# Patient Record
Sex: FEMALE | Race: WHITE | NOT HISPANIC OR LATINO
[De-identification: names, ages, dates, MRNs, and addresses within clinical notes are randomized per-mention and may not be internally consistent; named-entity substitution may affect disease eponyms.]

---

## 2018-12-24 PROBLEM — Z00.00 ENCOUNTER FOR PREVENTIVE HEALTH EXAMINATION: Status: ACTIVE | Noted: 2018-12-24

## 2020-01-28 ENCOUNTER — APPOINTMENT (OUTPATIENT)
Dept: MAMMOGRAPHY | Facility: CLINIC | Age: 75
End: 2020-01-28
Payer: MEDICARE

## 2020-01-28 ENCOUNTER — APPOINTMENT (OUTPATIENT)
Dept: OBGYN | Facility: CLINIC | Age: 75
End: 2020-01-28
Payer: MEDICARE

## 2020-01-28 VITALS
WEIGHT: 175 LBS | HEIGHT: 64 IN | SYSTOLIC BLOOD PRESSURE: 150 MMHG | DIASTOLIC BLOOD PRESSURE: 88 MMHG | BODY MASS INDEX: 29.88 KG/M2

## 2020-01-28 DIAGNOSIS — Z01.411 ENCOUNTER FOR GYNECOLOGICAL EXAMINATION (GENERAL) (ROUTINE) WITH ABNORMAL FINDINGS: ICD-10-CM

## 2020-01-28 DIAGNOSIS — L08.9 LOCAL INFECTION OF THE SKIN AND SUBCUTANEOUS TISSUE, UNSPECIFIED: ICD-10-CM

## 2020-01-28 DIAGNOSIS — Z86.39 PERSONAL HISTORY OF OTHER ENDOCRINE, NUTRITIONAL AND METABOLIC DISEASE: ICD-10-CM

## 2020-01-28 PROCEDURE — 77063 BREAST TOMOSYNTHESIS BI: CPT

## 2020-01-28 PROCEDURE — 77067 SCR MAMMO BI INCL CAD: CPT

## 2020-01-28 PROCEDURE — 99397 PER PM REEVAL EST PAT 65+ YR: CPT

## 2020-01-28 PROCEDURE — 99212 OFFICE O/P EST SF 10 MIN: CPT | Mod: 25

## 2020-01-28 RX ORDER — OMEPRAZOLE 20 MG/1
20 CAPSULE, DELAYED RELEASE ORAL
Refills: 0 | Status: ACTIVE | COMMUNITY

## 2020-01-28 NOTE — PHYSICAL EXAM
[Awake] : awake [Alert] : alert [LAD] : no lymphadenopathy [Acute Distress] : no acute distress [Thyroid Nodule] : no thyroid nodule [Goiter] : no goiter [Mass] : no breast mass [Nipple Discharge] : no nipple discharge [Axillary LAD] : no axillary lymphadenopathy [Soft] : soft [Tender] : non tender [Oriented x3] : oriented to person, place, and time [Normal] : cervix [No Bleeding] : there was no active vaginal bleeding [Uterine Adnexae] : were not tender and not enlarged

## 2020-01-28 NOTE — CHIEF COMPLAINT
[Annual Visit] : annual visit [FreeTextEntry1] : 75 y/o P3 presents for annual exam. After exam patient requests evaluation of sore on her hand as well.\par Pt last pap was >2 years ago\par Last mammo was also >2 years ago\par Last colonoscopy was last month\par BOne density was not quite 2 years ago\par b/p today was elevated today-pt reports she has never had an elevated b/p before\par \par Sore on hand was 2/2 to running her hand along a railing and getting a splinter which she removed. She has since popped it with a sterilized pin a couple of times to relieve the pressure and treated it with OTC bacitracin which has not helped

## 2020-01-29 LAB — HPV HIGH+LOW RISK DNA PNL CVX: NOT DETECTED

## 2020-02-03 LAB — CYTOLOGY CVX/VAG DOC THIN PREP: ABNORMAL

## 2021-02-24 ENCOUNTER — APPOINTMENT (OUTPATIENT)
Dept: OBGYN | Facility: CLINIC | Age: 76
End: 2021-02-24
Payer: COMMERCIAL

## 2021-02-24 VITALS
DIASTOLIC BLOOD PRESSURE: 72 MMHG | HEIGHT: 64 IN | WEIGHT: 166 LBS | SYSTOLIC BLOOD PRESSURE: 120 MMHG | BODY MASS INDEX: 28.34 KG/M2

## 2021-02-24 PROCEDURE — 99072 ADDL SUPL MATRL&STAF TM PHE: CPT

## 2021-02-24 PROCEDURE — 99397 PER PM REEVAL EST PAT 65+ YR: CPT

## 2021-02-26 NOTE — REASON FOR VISIT
[Annual] : an annual visit. [FreeTextEntry2] : Recently had COVID over 4 weeks ago, not hospitalized, feeling well today.

## 2021-02-26 NOTE — PLAN
Presents to ED after being found down in a local store. CPR down on scene by bystanders. EMS reports palpable pulse upon arrival on scene. Pt awake and alert at this time. 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 Respirations even and unlabored. Skin warm and dry to touch. No distress noted. Will continue to monitor.  
[FreeTextEntry1] : Pap, HPV today\par Mammo rx given\par Pt to f/u with PCP regarding colon cancer screening\par D/w pt cervical cancer screening per [facility], pt desires to continue yearly pap smears.\par RTO in 1 year or sooner prn

## 2021-02-26 NOTE — PHYSICAL EXAM
[Appropriately responsive] : appropriately responsive [Alert] : alert [No Acute Distress] : no acute distress [No Lymphadenopathy] : no lymphadenopathy [Regular Rate Rhythm] : regular rate rhythm [No Murmurs] : no murmurs [Clear to Auscultation B/L] : clear to auscultation bilaterally [Soft] : soft [Non-tender] : non-tender [Non-distended] : non-distended [No HSM] : No HSM [Oriented x3] : oriented x3 [Examination Of The Breasts] : a normal appearance [No Masses] : no breast masses were palpable [Labia Majora] : normal [Labia Minora] : normal [No Bleeding] : There was no active vaginal bleeding [Normal] : normal [Uterine Adnexae] : normal [Declined] : Patient declined rectal exam

## 2021-03-01 LAB
CYTOLOGY CVX/VAG DOC THIN PREP: ABNORMAL
HPV HIGH+LOW RISK DNA PNL CVX: NOT DETECTED

## 2021-05-11 ENCOUNTER — RESULT REVIEW (OUTPATIENT)
Age: 76
End: 2021-05-11

## 2021-05-11 ENCOUNTER — APPOINTMENT (OUTPATIENT)
Dept: MAMMOGRAPHY | Facility: CLINIC | Age: 76
End: 2021-05-11
Payer: MEDICARE

## 2021-05-11 PROCEDURE — 77063 BREAST TOMOSYNTHESIS BI: CPT

## 2021-05-11 PROCEDURE — 77067 SCR MAMMO BI INCL CAD: CPT

## 2022-04-18 ENCOUNTER — APPOINTMENT (OUTPATIENT)
Dept: OBGYN | Facility: CLINIC | Age: 77
End: 2022-04-18
Payer: MEDICARE

## 2022-04-18 VITALS
DIASTOLIC BLOOD PRESSURE: 78 MMHG | WEIGHT: 175 LBS | SYSTOLIC BLOOD PRESSURE: 118 MMHG | BODY MASS INDEX: 29.88 KG/M2 | HEIGHT: 64 IN

## 2022-04-18 PROCEDURE — G0101: CPT

## 2022-04-18 RX ORDER — CEPHALEXIN 250 MG/1
250 CAPSULE ORAL 4 TIMES DAILY
Qty: 40 | Refills: 0 | Status: DISCONTINUED | COMMUNITY
Start: 2020-01-28 | End: 2022-04-18

## 2022-04-18 NOTE — PHYSICAL EXAM
[Appropriately responsive] : appropriately responsive [Alert] : alert [No Acute Distress] : no acute distress [No Lymphadenopathy] : no lymphadenopathy [Regular Rate Rhythm] : regular rate rhythm [No Murmurs] : no murmurs [Clear to Auscultation B/L] : clear to auscultation bilaterally [Soft] : soft [Non-tender] : non-tender [Non-distended] : non-distended [No Lesions] : no lesions [No HSM] : No HSM [No Mass] : no mass [Oriented x3] : oriented x3 [Examination Of The Breasts] : a normal appearance [No Masses] : no breast masses were palpable [Normal] : normal [Uterine Adnexae] : normal [Declined] : Patient declined rectal exam [FreeTextEntry1] : lichen sclerosus [FreeTextEntry2] : lichen sclerosus

## 2022-04-18 NOTE — DISCUSSION/SUMMARY
[FreeTextEntry1] : unremarkable CBE  exams\par SBE taught and encouraged monthly\par Mammo ordered\par Pap not collected after discussion of ACOG recommendations\par bilateral figure 8 lichen sclerosus, very pale, patient reports occasional itching only.  \par We reviewed etiology and sequelae of lichen sclerosus and Rx sent for clobetasol to use 3 x weekly to entire affected area.  RTO x 2 months to review and consider biopsy\par \par  I reviewed measures for maintaining optimal bone density including dietary intake of 1200 mg calcium and 800 units  of vitamin D daily and 30 minutes of weight bearing exercise for a minimum of 3 x weekly.  Patient given a list of dietary sources of calcium and vitamin D.  Patient verbalizes understanding of these recommendations\par

## 2022-04-18 NOTE — HISTORY OF PRESENT ILLNESS
[Patient reported mammogram was normal] : Patient reported mammogram was normal [Patient reported PAP Smear was normal] : Patient reported PAP Smear was normal [Patient reported colonoscopy was normal] : Patient reported colonoscopy was normal [HIV test declined] : HIV test declined [Syphilis test declined] : Syphilis test declined [Gonorrhea test declined] : Gonorrhea test declined [Chlamydia test declined] : Chlamydia test declined [Trichomonas test declined] : Trichomonas test declined [postmenopausal] : postmenopausal [N] : Patient is not sexually active [Y] : Positive pregnancy history [TextBox_4] : 76 yoP3 for annual exam today.  Not certain that she needs further Paps.  No hx abnormal Paps, not sexually active for many years, never a smoker.  Hx hyperlipedmia and GERD.  Last Dexa 2 years ago was improved from former finding of mild osteopenia.   [Mammogramdate] : 2021 [BoneDensityDate] : 2020 [PapSmeardate] : 2021 [TextBox_37] : mild osteopenia [ColonoscopyDate] : 2019 [PGHxTotal] : 3 [[facility]

## 2022-05-02 RX ORDER — CLOBETASOL PROPIONATE 0.5 MG/G
0.05 CREAM TOPICAL
Qty: 45 | Refills: 2 | Status: ACTIVE | COMMUNITY
Start: 2022-04-18

## 2022-05-10 RX ORDER — CLOBETASOL PROPIONATE 0.5 MG/G
0.05 CREAM TOPICAL 3 TIMES DAILY
Qty: 45 | Refills: 2 | Status: ACTIVE | COMMUNITY
Start: 2022-05-10 | End: 1900-01-01

## 2022-05-13 ENCOUNTER — NON-APPOINTMENT (OUTPATIENT)
Age: 77
End: 2022-05-13

## 2022-06-13 ENCOUNTER — RESULT REVIEW (OUTPATIENT)
Age: 77
End: 2022-06-13

## 2022-06-13 ENCOUNTER — APPOINTMENT (OUTPATIENT)
Dept: MAMMOGRAPHY | Facility: CLINIC | Age: 77
End: 2022-06-13
Payer: MEDICARE

## 2022-06-13 PROCEDURE — 77067 SCR MAMMO BI INCL CAD: CPT

## 2022-06-13 PROCEDURE — 77063 BREAST TOMOSYNTHESIS BI: CPT

## 2022-06-27 ENCOUNTER — APPOINTMENT (OUTPATIENT)
Dept: OBGYN | Facility: CLINIC | Age: 77
End: 2022-06-27
Payer: MEDICARE

## 2022-06-27 VITALS
WEIGHT: 177 LBS | SYSTOLIC BLOOD PRESSURE: 126 MMHG | DIASTOLIC BLOOD PRESSURE: 80 MMHG | BODY MASS INDEX: 30.22 KG/M2 | HEIGHT: 64 IN

## 2022-06-27 PROCEDURE — 99213 OFFICE O/P EST LOW 20 MIN: CPT

## 2022-06-27 NOTE — PLAN
[FreeTextEntry1] : Rx Halobetaol to use nightly for 12 weeks\par RTO to evaluate progress and may taper to 2 x weekly at that point\par No lesions noted\par  Patient verbalizes understanding of and agreement with this plan.  All questions answered to patient's satisfaction.\par

## 2022-06-27 NOTE — PHYSICAL EXAM
[Appropriately responsive] : appropriately responsive [Alert] : alert [No Acute Distress] : no acute distress [Oriented x3] : oriented x3 [FreeTextEntry1] : lichen sclerosus [FreeTextEntry2] : lichen sclerosus

## 2022-08-03 RX ORDER — BETAMETHASONE DIPROPIONATE 0.5 MG/G
0.05 OINTMENT TOPICAL TWICE DAILY
Qty: 1 | Refills: 2 | Status: ACTIVE | COMMUNITY
Start: 2022-08-03

## 2022-09-26 ENCOUNTER — APPOINTMENT (OUTPATIENT)
Dept: OBGYN | Facility: CLINIC | Age: 77
End: 2022-09-26

## 2022-09-26 VITALS — WEIGHT: 178 LBS | BODY MASS INDEX: 30.39 KG/M2 | HEIGHT: 64 IN

## 2022-09-26 PROCEDURE — 99213 OFFICE O/P EST LOW 20 MIN: CPT

## 2022-09-26 NOTE — HISTORY OF PRESENT ILLNESS
[FreeTextEntry1] : 76 y.o. P3 with dx of lichen sclerosus.  Had inadequate treatment and was restarted on Clobetasol 6/27/22.  Here for re-evaluation of efficacy. Feels well, no further itching.

## 2022-09-26 NOTE — PHYSICAL EXAM
[Appropriately responsive] : appropriately responsive [Alert] : alert [Oriented x3] : oriented x3 [Labia Majora] : normal [Labia Minora] : normal [Normal] : normal [FreeTextEntry1] : Lichen sclerosus resolved [FreeTextEntry2] : Lichen sclerosus resolved

## 2022-09-26 NOTE — PLAN
[FreeTextEntry1] : Lichen sclerosus appears resolved and patient denies any further vulvar itching\par We discussed option of 2x weekly Halbetosol maintenance therapy vs. stopping Halbetasol and  RTO x 3 months to re-evaluate and patient opts for latter option.\par RTO x 3 months

## 2023-01-04 ENCOUNTER — APPOINTMENT (OUTPATIENT)
Dept: OBGYN | Facility: CLINIC | Age: 78
End: 2023-01-04
Payer: MEDICARE

## 2023-01-04 VITALS
SYSTOLIC BLOOD PRESSURE: 136 MMHG | DIASTOLIC BLOOD PRESSURE: 82 MMHG | HEIGHT: 61 IN | BODY MASS INDEX: 33.99 KG/M2 | WEIGHT: 180 LBS

## 2023-01-04 DIAGNOSIS — N90.4 LEUKOPLAKIA OF VULVA: ICD-10-CM

## 2023-01-04 PROCEDURE — 99213 OFFICE O/P EST LOW 20 MIN: CPT

## 2023-01-04 NOTE — HISTORY OF PRESENT ILLNESS
[FreeTextEntry1] : 77 y.o. P3 with dx of lichen sclerosus. Had inadequate treatment and was restarted on Clobetasol 6/27/22. Improvement noted at last visit  9/26/22 and it was decided to change patient to twice weekly Clobetasol which she has been doing.  Feels well, no further itching.

## 2023-01-04 NOTE — PLAN
[FreeTextEntry1] : Lichen sclerosus not currently present and itching has stopped per patient.  I have advised to maintain twice weekly regimen and will review again at annual exam 4/2023

## 2023-01-04 NOTE — PHYSICAL EXAM
[Appropriately responsive] : appropriately responsive [Alert] : alert [No Acute Distress] : no acute distress [Oriented x3] : oriented x3 [Labia Majora] : normal [Labia Minora] : normal [Normal] : normal [FreeTextEntry1] : mild atrophy

## 2023-02-13 ENCOUNTER — OFFICE (OUTPATIENT)
Dept: URBAN - METROPOLITAN AREA CLINIC 9 | Facility: CLINIC | Age: 78
Setting detail: OPHTHALMOLOGY
End: 2023-02-13
Payer: MEDICARE

## 2023-02-13 DIAGNOSIS — H43.813: ICD-10-CM

## 2023-02-13 DIAGNOSIS — Z96.1: ICD-10-CM

## 2023-02-13 DIAGNOSIS — H26.491: ICD-10-CM

## 2023-02-13 DIAGNOSIS — H43.22: ICD-10-CM

## 2023-02-13 DIAGNOSIS — H52.4: ICD-10-CM

## 2023-02-13 DIAGNOSIS — H16.223: ICD-10-CM

## 2023-02-13 PROCEDURE — 92015 DETERMINE REFRACTIVE STATE: CPT | Performed by: OPHTHALMOLOGY

## 2023-02-13 PROCEDURE — 92014 COMPRE OPH EXAM EST PT 1/>: CPT | Performed by: OPHTHALMOLOGY

## 2023-02-13 ASSESSMENT — KERATOMETRY
OD_K2POWER_DIOPTERS: 46.50
METHOD_AUTO_MANUAL: AUTO
OS_AXISANGLE_DEGREES: 090
OS_K1POWER_DIOPTERS: 46.25
OD_K1POWER_DIOPTERS: 46.25
OD_AXISANGLE_DEGREES: 135
OS_K2POWER_DIOPTERS: 46.25

## 2023-02-13 ASSESSMENT — REFRACTION_MANIFEST
OD_ADD: +2.75
OD_VA2: 20/20(J1+)
OS_SPHERE: -0.25
OS_VA1: 20/20
OD_VA2: 20/20(J1+)
OS_ADD: +2.75
OS_AXIS: 175
OD_SPHERE: PLANO
OU_VA: 20/20
OS_VA2: 20/20(J1+)
OS_ADD: +2.75
OS_CYLINDER: +0.75
OD_CYLINDER: +0.50
OS_SPHERE: PLANO
OD_VA1: 20/20
OD_CYLINDER: SPHERE
OS_CYLINDER: +0.75
OD_AXIS: 005
OS_VA2: 20/20(J1+)
OS_AXIS: 180
OD_ADD: +2.75
OS_VA1: 20/20
OD_VA1: 20/20
OD_SPHERE: PLANO
OU_VA: 20/20

## 2023-02-13 ASSESSMENT — REFRACTION_AUTOREFRACTION
OD_CYLINDER: +0.50
OS_AXIS: 176
OS_SPHERE: -0.25
OD_SPHERE: -0.25
OS_CYLINDER: +1.00
OD_AXIS: 004

## 2023-02-13 ASSESSMENT — REFRACTION_CURRENTRX
OD_AXIS: 013
OS_CYLINDER: +0.50
OD_VPRISM_DIRECTION: PROGS
OS_VPRISM_DIRECTION: PROGS
OS_VPRISM_DIRECTION: PROGS
OD_CYLINDER: +0.75
OD_AXIS: 003
OS_SPHERE: PLANO
OD_SPHERE: PLANO
OS_SPHERE: -0.25
OS_ADD: +2.50
OD_SPHERE: -0.25
OD_OVR_VA: 20/
OS_OVR_VA: 20/
OS_AXIS: 166
OD_VPRISM_DIRECTION: PROGS
OS_CYLINDER: +0.50
OS_AXIS: 156
OS_OVR_VA: 20/
OS_ADD: +2.50
OD_ADD: +2.50
OD_ADD: +2.75
OD_CYLINDER: +0.75
OD_OVR_VA: 20/

## 2023-02-13 ASSESSMENT — AXIALLENGTH_DERIVED
OS_AL: 22.5341
OD_AL: 22.5811
OS_AL: 22.5786

## 2023-02-13 ASSESSMENT — VISUAL ACUITY
OS_BCVA: 20/20
OD_BCVA: 20/20-

## 2023-02-13 ASSESSMENT — SPHEQUIV_DERIVED
OS_SPHEQUIV: 0.125
OS_SPHEQUIV: 0.25
OD_SPHEQUIV: 0

## 2023-02-13 ASSESSMENT — CONFRONTATIONAL VISUAL FIELD TEST (CVF)
OS_FINDINGS: FULL
OD_FINDINGS: FULL

## 2023-05-08 ENCOUNTER — APPOINTMENT (OUTPATIENT)
Dept: OBGYN | Facility: CLINIC | Age: 78
End: 2023-05-08
Payer: MEDICARE

## 2023-05-08 VITALS
HEIGHT: 61 IN | SYSTOLIC BLOOD PRESSURE: 122 MMHG | WEIGHT: 179 LBS | BODY MASS INDEX: 33.79 KG/M2 | DIASTOLIC BLOOD PRESSURE: 80 MMHG

## 2023-05-08 DIAGNOSIS — Z13.820 ENCOUNTER FOR SCREENING FOR OSTEOPOROSIS: ICD-10-CM

## 2023-05-08 PROCEDURE — G0101: CPT

## 2023-05-08 NOTE — PHYSICAL EXAM
[Appropriately responsive] : appropriately responsive [Alert] : alert [No Acute Distress] : no acute distress [No Lymphadenopathy] : no lymphadenopathy [Regular Rate Rhythm] : regular rate rhythm [No Murmurs] : no murmurs [Clear to Auscultation B/L] : clear to auscultation bilaterally [Soft] : soft [Non-tender] : non-tender [Non-distended] : non-distended [No HSM] : No HSM [No Lesions] : no lesions [No Mass] : no mass [Oriented x3] : oriented x3 [Examination Of The Breasts] : a normal appearance [No Masses] : no breast masses were palpable [Labia Majora] : normal [Labia Minora] : normal [Atrophy] : atrophy [Dry Mucosa] : dry mucosa [Normal] : normal [Uterine Adnexae] : normal [FreeTextEntry2] : lichen sclerosus not noted at present

## 2023-05-08 NOTE — PLAN
[FreeTextEntry1] : unremarkable CBE and pelvic exams\par SBE taught and encouraged monthly\par Pap/HPV not collected per ACOG guidelines after discussion with patient\par Mammo ordered\par DEXA ordered\par I reviewed dietary, vitamin and exercise measures for maintaining optimal bone density and patient verbalizes understanding of these recommendations.\par Healthy diet,exercise and sleep hygiene discussed\par The importance of a screening colonoscopy was discussed and patient is up to date on this.\par Rx halobetasol and encouraged to use at least once a week.\par

## 2023-05-08 NOTE — HISTORY OF PRESENT ILLNESS
[HIV test declined] : HIV test declined [Syphilis test declined] : Syphilis test declined [Gonorrhea test declined] : Gonorrhea test declined [Chlamydia test declined] : Chlamydia test declined [Trichomonas test declined] : Trichomonas test declined [postmenopausal] : postmenopausal [Y] : Positive pregnancy history [TextBox_4] : 78 yo  for well woman exam.  Hx lichen sclerosus and has been treated with Halbetasol.  Uses rarely as she feels completely better. Patient is pre-diabetic and on metformin.  On omeprazole for GERD.  No current gyn concerns. Not sexually active [Mammogramdate] : 06/22 [PapSmeardate] : 2/21 [PGHxTotal] : 3 [[facility] : [facility] [[facility]] : [facility]

## 2023-06-16 ENCOUNTER — APPOINTMENT (OUTPATIENT)
Dept: RADIOLOGY | Facility: CLINIC | Age: 78
End: 2023-06-16

## 2023-06-16 ENCOUNTER — APPOINTMENT (OUTPATIENT)
Dept: MAMMOGRAPHY | Facility: CLINIC | Age: 78
End: 2023-06-16
Payer: MEDICARE

## 2023-06-16 ENCOUNTER — RESULT REVIEW (OUTPATIENT)
Age: 78
End: 2023-06-16

## 2023-06-16 PROCEDURE — 77063 BREAST TOMOSYNTHESIS BI: CPT

## 2023-06-16 PROCEDURE — 77085 DXA BONE DENSITY AXL VRT FX: CPT

## 2023-06-16 PROCEDURE — 77067 SCR MAMMO BI INCL CAD: CPT

## 2024-05-14 PROBLEM — Z01.419 ENCOUNTER FOR ROUTINE GYNECOLOGICAL EXAMINATION: Status: ACTIVE | Noted: 2021-02-24

## 2024-05-14 NOTE — HISTORY OF PRESENT ILLNESS
[TextBox_4] : 79 yo  for well woman exam. Hx lichen sclerosus and has been treated with Halbetasol. Patient is pre-diabetic and on metformin. On omeprazole for GERD. No current gyn concerns. Not sexually active

## 2024-05-15 ENCOUNTER — APPOINTMENT (OUTPATIENT)
Dept: OBGYN | Facility: CLINIC | Age: 79
End: 2024-05-15

## 2024-05-15 DIAGNOSIS — Z01.419 ENCOUNTER FOR GYNECOLOGICAL EXAMINATION (GENERAL) (ROUTINE) W/OUT ABNORMAL FINDINGS: ICD-10-CM

## 2024-06-17 ENCOUNTER — APPOINTMENT (OUTPATIENT)
Dept: MAMMOGRAPHY | Facility: CLINIC | Age: 79
End: 2024-06-17

## 2024-06-17 PROCEDURE — 77063 BREAST TOMOSYNTHESIS BI: CPT

## 2024-06-17 PROCEDURE — 77067 SCR MAMMO BI INCL CAD: CPT

## 2024-06-20 ENCOUNTER — RX RENEWAL (OUTPATIENT)
Age: 79
End: 2024-06-20

## 2024-06-20 RX ORDER — HALOBETASOL PROPIONATE 0.5 MG/G
0.05 CREAM TOPICAL
Qty: 15 | Refills: 3 | Status: ACTIVE | COMMUNITY
Start: 2022-06-27 | End: 1900-01-01

## 2024-06-28 ENCOUNTER — OFFICE (OUTPATIENT)
Dept: URBAN - METROPOLITAN AREA CLINIC 9 | Facility: CLINIC | Age: 79
Setting detail: OPHTHALMOLOGY
End: 2024-06-28
Payer: MEDICARE

## 2024-06-28 VITALS — HEIGHT: 55 IN

## 2024-06-28 DIAGNOSIS — H52.4: ICD-10-CM

## 2024-06-28 DIAGNOSIS — E11.9: ICD-10-CM

## 2024-06-28 DIAGNOSIS — H43.22: ICD-10-CM

## 2024-06-28 DIAGNOSIS — H16.223: ICD-10-CM

## 2024-06-28 DIAGNOSIS — H26.491: ICD-10-CM

## 2024-06-28 DIAGNOSIS — H43.813: ICD-10-CM

## 2024-06-28 DIAGNOSIS — E11.3293: ICD-10-CM

## 2024-06-28 DIAGNOSIS — Z96.1: ICD-10-CM

## 2024-06-28 PROCEDURE — 92014 COMPRE OPH EXAM EST PT 1/>: CPT | Performed by: OPHTHALMOLOGY

## 2024-06-28 PROCEDURE — 92015 DETERMINE REFRACTIVE STATE: CPT | Performed by: OPHTHALMOLOGY

## 2024-06-28 ASSESSMENT — CONFRONTATIONAL VISUAL FIELD TEST (CVF)
OD_FINDINGS: FULL
OS_FINDINGS: FULL

## 2025-05-12 ENCOUNTER — APPOINTMENT (OUTPATIENT)
Dept: OBGYN | Facility: CLINIC | Age: 80
End: 2025-05-12
Payer: MEDICARE

## 2025-05-12 VITALS — DIASTOLIC BLOOD PRESSURE: 83 MMHG | SYSTOLIC BLOOD PRESSURE: 134 MMHG

## 2025-05-12 VITALS
WEIGHT: 180 LBS | SYSTOLIC BLOOD PRESSURE: 161 MMHG | HEIGHT: 61 IN | BODY MASS INDEX: 33.99 KG/M2 | DIASTOLIC BLOOD PRESSURE: 96 MMHG

## 2025-05-12 DIAGNOSIS — Z01.411 ENCOUNTER FOR GYNECOLOGICAL EXAMINATION (GENERAL) (ROUTINE) WITH ABNORMAL FINDINGS: ICD-10-CM

## 2025-05-12 PROCEDURE — G0101: CPT

## 2025-06-20 ENCOUNTER — APPOINTMENT (OUTPATIENT)
Dept: MAMMOGRAPHY | Facility: CLINIC | Age: 80
End: 2025-06-20

## 2025-07-07 ENCOUNTER — APPOINTMENT (OUTPATIENT)
Dept: RADIOLOGY | Facility: CLINIC | Age: 80
End: 2025-07-07

## 2025-07-07 ENCOUNTER — APPOINTMENT (OUTPATIENT)
Dept: MAMMOGRAPHY | Facility: CLINIC | Age: 80
End: 2025-07-07
Payer: MEDICARE

## 2025-07-07 PROCEDURE — 77067 SCR MAMMO BI INCL CAD: CPT

## 2025-07-07 PROCEDURE — 77063 BREAST TOMOSYNTHESIS BI: CPT

## 2025-07-07 PROCEDURE — 77085 DXA BONE DENSITY AXL VRT FX: CPT
